# Patient Record
Sex: MALE | Race: WHITE | NOT HISPANIC OR LATINO | URBAN - METROPOLITAN AREA
[De-identification: names, ages, dates, MRNs, and addresses within clinical notes are randomized per-mention and may not be internally consistent; named-entity substitution may affect disease eponyms.]

---

## 2017-10-02 ENCOUNTER — OFFICE (OUTPATIENT)
Dept: URBAN - METROPOLITAN AREA CLINIC 95 | Facility: CLINIC | Age: 77
End: 2017-10-02

## 2017-10-02 VITALS
SYSTOLIC BLOOD PRESSURE: 107 MMHG | HEART RATE: 80 BPM | DIASTOLIC BLOOD PRESSURE: 72 MMHG | WEIGHT: 240 LBS | HEIGHT: 71 IN

## 2017-10-02 DIAGNOSIS — K30 DYSPEPSIA: ICD-10-CM

## 2017-10-02 DIAGNOSIS — R10.11 RUQ PAIN: ICD-10-CM

## 2017-10-02 PROCEDURE — 99203 OFFICE O/P NEW LOW 30 MIN: CPT

## 2017-10-02 NOTE — SERVICEHPINOTES
This is a 77 yo man here with right sided abd pain. It has been going on and off for a few months. He has not had any recent imaging studies. He denies any blood in the stool. He denies any nausea or vomiting. He denies any jaundice. There is no mention of any unintentional weight loss. The patient is in good spirits. He had a colonoscopy with Dr. Patricio in [place], [place] and he had polyps. He was told f/u in 5 years ago for repeat colonoscopy. He has intact GB.

## 2017-10-13 ENCOUNTER — OFFICE (OUTPATIENT)
Dept: URBAN - METROPOLITAN AREA CLINIC 99 | Facility: CLINIC | Age: 77
End: 2017-10-13

## 2017-10-13 VITALS
HEART RATE: 88 BPM | SYSTOLIC BLOOD PRESSURE: 111 MMHG | DIASTOLIC BLOOD PRESSURE: 69 MMHG | HEIGHT: 71 IN | WEIGHT: 241 LBS

## 2017-10-13 NOTE — SERVICEHPINOTES
This is a 77 yo man here with right sided abd pain. It has been going on and off for a few months. He has not had any recent imaging studies. He denies any blood in the stool. He denies any nausea or vomiting. He denies any jaundice. There is no mention of any unintentional weight loss. The patient is in good spirits. He had a colonoscopy with Dr. Patricio in [place], [place] and he had polyps. He was told f/u in 5 years ago for repeat colonoscopy. He has intact GB.

## 2020-06-08 ENCOUNTER — OFFICE (OUTPATIENT)
Dept: URBAN - METROPOLITAN AREA CLINIC 99 | Facility: CLINIC | Age: 80
End: 2020-06-08

## 2020-06-08 VITALS — HEIGHT: 71 IN

## 2020-06-08 DIAGNOSIS — R10.11 RUQ PAIN: ICD-10-CM

## 2020-06-08 DIAGNOSIS — C61 CARCINOMA OF PROSTATE: ICD-10-CM

## 2020-06-08 DIAGNOSIS — K76.0 STEATOSIS OF LIVER: ICD-10-CM

## 2020-06-08 DIAGNOSIS — K30 DYSPEPSIA: ICD-10-CM

## 2020-06-08 PROCEDURE — 99215 OFFICE O/P EST HI 40 MIN: CPT

## 2020-06-08 PROCEDURE — G0406 INPT/TELE FOLLOW UP 15: HCPCS

## 2020-06-08 RX ORDER — ESOMEPRAZOLE MAGNESIUM 20 MG/1
20 TABLET ORAL
Qty: 90 | Status: COMPLETED
Start: 2020-06-08 | End: 2021-03-23

## 2020-06-08 NOTE — SERVICENOTES
The patient consented to telehealth visit. PE limited by telemedicine. The patient confirms that they are in the state [facility].

## 2020-06-08 NOTE — SERVICEHPINOTES
This is a 77 yo man here with right sided abd pain. It has been going on and off for a few months. He has not had any recent imaging studies. He denies any blood in the stool. He denies any nausea or vomiting. He denies any jaundice. There is no mention of any unintentional weight loss. The patient is in good spirits. He had a colonoscopy with Dr. Patricio in [place], [place] and he had polyps. He was told f/u in 5 years ago for repeat colonoscopy. He has intact GB. BR6/8/2020: The patient states that he is having worsening abdominal pain. He is having some knee problems and he is having a knee replacement. He states that he has been taking high dose NSAIDs because of his knee pain. He is unsure if he has an ulcer. He denies any nausea or vomiting. He denies any unintentional weight loss. He denies any jaundice. He denies any fevers or chills.    The patient was given some Carafate for the abdominal pain but he is not sure if this helps. He is on a PPI.

## 2020-08-07 ENCOUNTER — APPOINTMENT (RX ONLY)
Dept: URBAN - NONMETROPOLITAN AREA CLINIC 13 | Facility: CLINIC | Age: 80
Setting detail: DERMATOLOGY
End: 2020-08-07

## 2020-08-07 DIAGNOSIS — L20.89 OTHER ATOPIC DERMATITIS: ICD-10-CM

## 2020-08-07 DIAGNOSIS — L30.4 ERYTHEMA INTERTRIGO: ICD-10-CM

## 2020-08-07 PROBLEM — L20.84 INTRINSIC (ALLERGIC) ECZEMA: Status: ACTIVE | Noted: 2020-08-07

## 2020-08-07 PROCEDURE — 99202 OFFICE O/P NEW SF 15 MIN: CPT

## 2020-08-07 PROCEDURE — ? COUNSELING

## 2020-08-07 PROCEDURE — ? RECOMMENDATIONS

## 2020-08-07 PROCEDURE — ? PRESCRIPTION

## 2020-08-07 RX ORDER — KETOCONAZOLE 20 MG/G
AAA CREAM TOPICAL
Qty: 60 | Refills: 2 | Status: CANCELLED

## 2020-08-07 ASSESSMENT — LOCATION DETAILED DESCRIPTION DERM
LOCATION DETAILED: RIGHT INGUINAL CREASE
LOCATION DETAILED: RIGHT ANTERIOR PROXIMAL THIGH

## 2020-08-07 ASSESSMENT — LOCATION ZONE DERM
LOCATION ZONE: TRUNK
LOCATION ZONE: LEG

## 2020-08-07 ASSESSMENT — LOCATION SIMPLE DESCRIPTION DERM
LOCATION SIMPLE: RIGHT THIGH
LOCATION SIMPLE: GROIN

## 2020-08-07 NOTE — HPI: OTHER
Condition:: Rash
Please Describe Your Condition:: Rash upper thighs, groin off and on x years. Treating with Triamcinolone 0.1% right now. Getting better. Lives in [place], [place], usually better down there, worse here.

## 2020-09-15 ENCOUNTER — APPOINTMENT (RX ONLY)
Dept: URBAN - NONMETROPOLITAN AREA CLINIC 13 | Facility: CLINIC | Age: 80
Setting detail: DERMATOLOGY
End: 2020-09-15

## 2020-09-15 DIAGNOSIS — L30.4 ERYTHEMA INTERTRIGO: ICD-10-CM | Status: RESOLVED

## 2020-09-15 DIAGNOSIS — L57.0 ACTINIC KERATOSIS: ICD-10-CM

## 2020-09-15 DIAGNOSIS — L81.0 POSTINFLAMMATORY HYPERPIGMENTATION: ICD-10-CM

## 2020-09-15 PROCEDURE — ? LIQUID NITROGEN

## 2020-09-15 PROCEDURE — ? COUNSELING

## 2020-09-15 PROCEDURE — 99212 OFFICE O/P EST SF 10 MIN: CPT | Mod: 25

## 2020-09-15 PROCEDURE — 17000 DESTRUCT PREMALG LESION: CPT

## 2020-09-15 PROCEDURE — ? PRESCRIPTION MEDICATION MANAGEMENT

## 2020-09-15 ASSESSMENT — LOCATION DETAILED DESCRIPTION DERM
LOCATION DETAILED: RIGHT ANTERIOR PROXIMAL THIGH
LOCATION DETAILED: LEFT SUPERIOR LATERAL MALAR CHEEK

## 2020-09-15 ASSESSMENT — LOCATION SIMPLE DESCRIPTION DERM
LOCATION SIMPLE: RIGHT THIGH
LOCATION SIMPLE: LEFT CHEEK

## 2020-09-15 ASSESSMENT — LOCATION ZONE DERM
LOCATION ZONE: LEG
LOCATION ZONE: FACE

## 2020-09-15 NOTE — PROCEDURE: PRESCRIPTION MEDICATION MANAGEMENT
Modify Regimen: Continue Ketoconazole q am until clear for flares only along with Zeazorb powder daily to prevent flares. Discussed putting socks on before undershorts to prevent spread of fungus.
Render In Strict Bullet Format?: No
Detail Level: Zone

## 2020-09-15 NOTE — HPI: OTHER
Condition:: Eczema follow up
Please Describe Your Condition:: pt has been using the Triamcinolone 0.1% bid and feels it is not much better. Still red and irritated in groin area.

## 2020-12-03 ENCOUNTER — OFFICE (OUTPATIENT)
Dept: URBAN - METROPOLITAN AREA CLINIC 95 | Facility: CLINIC | Age: 80
End: 2020-12-03

## 2020-12-03 VITALS — HEIGHT: 71 IN

## 2020-12-03 DIAGNOSIS — R10.13 EPIGASTRIC PAIN: ICD-10-CM

## 2020-12-03 DIAGNOSIS — C61 CARCINOMA OF PROSTATE: ICD-10-CM

## 2020-12-03 PROCEDURE — 99214 OFFICE O/P EST MOD 30 MIN: CPT | Performed by: INTERNAL MEDICINE

## 2020-12-03 PROCEDURE — G0406 INPT/TELE FOLLOW UP 15: HCPCS | Performed by: INTERNAL MEDICINE

## 2020-12-03 RX ORDER — PANTOPRAZOLE 20 MG/1
TABLET, DELAYED RELEASE ORAL
Qty: 30 | Status: COMPLETED
Start: 2020-12-03 | End: 2023-08-02

## 2020-12-03 NOTE — SERVICENOTES
Physical exam was limited as this was a telemedicine visit.
Outpatient telemedicine consult/visit: patient verbalized informed consent to proceed with the telephone/telehealth visit. They confirm they are in the [facility] at the time of this visit.

## 2020-12-03 NOTE — SERVICEHPINOTES
I had the pleasure of performing a telemedicine visit with your patient today accompanied by his wife. The telemedicine format was utilized secondary to the coronavirus pandemic and subsequent restrictions. The did not have video capability. I am meeting them for the first time as he was previously followed in our practice by Dr. Waters with his last telemedicine visit on June 8, 2020.The patient reports a more than 20 year history of intermittent epigastric discomfort. He describes this as a "pressure" which is more likely to occur in a postprandial fashion. He has been using an over-the-counter "Digestzen" which provides him with improvement recommended by his daughter. It is more likely to occur after coffee, creamers and sauces. He states he was placed on a medication such as Prilosec in the remote past which he took for 10 years without problems. His physician then recommend that he discontinue the medication over concerns with long-term usage. His wife states he had a prior history of ulcer disease. The patient was taking low-dose aspirin on a daily basis but discontinue the aspirin four months ago. He is not currently taking nonsteroidal anti-inflammatory medications. There is no anorexia, weight change, nausea, vomiting, regurgitation, heartburn, dysphagia, jaundice or fevers. There is no prior history of gallbladder disease. He is moving his bowels on a regular basis without diarrhea, constipation, rectal bleeding, melena or mucus.The patient underwent an upper endoscopy with Dr. Patricio on February 4, 2014 showing no evidence of esophagitis, Quintanilla's epithelium, ulcers or Helicobacter pylori. A colonoscopy on April 28, 2014 to the terminal ileum showed some mild to moderate diverticulosis no other abnormalities.Abdominal ultrasound on October 5, 2017 showed fatty infiltration of liver but no evidence of cholelithiasis.Laboratory studies from October 3, 2017 included a normal lipase, liver function tests.CT Urogram on March 21, 2017 showed renal cysts and diverticulosis but no underlying hepatic, biliary or pancreatic abnormalities.

## 2021-01-26 ENCOUNTER — RX ONLY (OUTPATIENT)
Age: 81
Setting detail: RX ONLY
End: 2021-01-26

## 2021-01-26 RX ORDER — KETOCONAZOLE 20 MG/G
AAA CREAM TOPICAL
Qty: 60 | Refills: 1 | Status: CANCELLED

## 2021-02-01 ENCOUNTER — RX ONLY (OUTPATIENT)
Age: 81
Setting detail: RX ONLY
End: 2021-02-01

## 2021-02-01 RX ORDER — KETOCONAZOLE 20 MG/G
AAA CREAM TOPICAL
Qty: 60 | Refills: 1 | Status: ERX

## 2021-02-01 RX ORDER — KETOCONAZOLE 20 MG/G
AAA CREAM TOPICAL
Qty: 60 | Refills: 2 | Status: ERX

## 2021-02-04 ENCOUNTER — RX ONLY (OUTPATIENT)
Age: 81
Setting detail: RX ONLY
End: 2021-02-04

## 2021-02-04 RX ORDER — TRIAMCINOLONE ACETONIDE 1 MG/G
AAA CREAM TOPICAL BID
Qty: 80 | Refills: 0 | Status: ERX | COMMUNITY
Start: 2021-02-04

## 2021-03-18 ENCOUNTER — OFFICE (OUTPATIENT)
Dept: URBAN - METROPOLITAN AREA CLINIC 95 | Facility: CLINIC | Age: 81
End: 2021-03-18

## 2021-03-18 VITALS
HEART RATE: 75 BPM | HEIGHT: 71 IN | TEMPERATURE: 97.2 F | WEIGHT: 245 LBS | DIASTOLIC BLOOD PRESSURE: 80 MMHG | SYSTOLIC BLOOD PRESSURE: 130 MMHG

## 2021-03-18 DIAGNOSIS — Z11.59 COVID SCREENING: ICD-10-CM

## 2021-03-18 PROCEDURE — 99212 OFFICE O/P EST SF 10 MIN: CPT | Performed by: INTERNAL MEDICINE

## 2021-03-18 NOTE — SERVICEHPINOTES
The patient is scheduled today for a COVID test only. The patient reports no symptoms of COVID and denies fever (greater than 100.4 F or 38.0 C) cough, shortness of breath, difficulty breathing, chest pain, sore throat, loss of sense of smell or taste, new onset of severe fatigue or lack of energy and recent onset of nausea with or without vomiting or diarrhea. Patient denies recent travel outside of the country in the past 14 days. BRPatient also denies close contact (6 feet) with someone who has a laboratory confirmed COVID-19 diagnosis in the past 14 days BRToday a nasal swab or nasopharyngeal swab was obtained for COVID19 PCR and sent to the lab. BR

## 2021-03-23 ENCOUNTER — AMBULATORY SURGICAL CENTER (OUTPATIENT)
Dept: URBAN - METROPOLITAN AREA SURGERY 64 | Facility: SURGERY | Age: 81
End: 2021-03-23

## 2021-03-23 VITALS
DIASTOLIC BLOOD PRESSURE: 90 MMHG | OXYGEN SATURATION: 95 % | TEMPERATURE: 98 F | HEART RATE: 66 BPM | RESPIRATION RATE: 14 BRPM | SYSTOLIC BLOOD PRESSURE: 153 MMHG | WEIGHT: 242 LBS | HEIGHT: 71 IN

## 2021-03-23 DIAGNOSIS — K30 FUNCTIONAL DYSPEPSIA: ICD-10-CM

## 2021-03-23 DIAGNOSIS — R10.13 EPIGASTRIC PAIN: ICD-10-CM

## 2021-03-23 DIAGNOSIS — K22.8 OTHER SPECIFIED DISEASES OF ESOPHAGUS: ICD-10-CM

## 2021-03-23 DIAGNOSIS — K31.89 OTHER DISEASES OF STOMACH AND DUODENUM: ICD-10-CM

## 2021-03-23 DIAGNOSIS — K20.80 OTHER ESOPHAGITIS WITHOUT BLEEDING: ICD-10-CM

## 2021-03-23 LAB — SURGICAL: PDF REPORT: (no result)

## 2021-03-23 PROCEDURE — 43239 EGD BIOPSY SINGLE/MULTIPLE: CPT | Performed by: INTERNAL MEDICINE

## 2021-03-23 RX ORDER — PANTOPRAZOLE 20 MG/1
TABLET, DELAYED RELEASE ORAL
Qty: 30 | Status: COMPLETED
Start: 2020-12-03 | End: 2023-08-02

## 2021-03-23 RX ORDER — ESOMEPRAZOLE MAGNESIUM 20 MG/1
20 TABLET ORAL
Qty: 90 | Status: COMPLETED
Start: 2020-06-08 | End: 2021-03-23

## 2021-03-23 NOTE — SERVICEHPINOTES
I had the pleasure of performing a telemedicine visit with your patient today accompanied by his wife. The telemedicine format was utilized secondary to the coronavirus pandemic and subsequent restrictions. The did not have video capability. I am meeting them for the first time as he was previously followed in our practice by Dr. Waters with his last telemedicine visit on June 8, 2020.The patient reports a more than 20 year history of intermittent epigastric discomfort. He describes this as a "pressure" which is more likely to occur in a postprandial fashion. He has been using an over-the-counter "Digestzen" which provides him with improvement recommended by his daughter. It is more likely to occur after coffee, creamers and sauces. He states he was placed on a medication such as Prilosec in the remote past which he took for 10 years without problems. His physician then recommend that he discontinue the medication over concerns with long-term usage. His wife states he had a prior history of ulcer disease. The patient was taking low-dose aspirin on a daily basis but discontinue the aspirin four months ago. He is not currently taking nonsteroidal anti-inflammatory medications. There is no anorexia, weight change, nausea, vomiting, regurgitation, heartburn, dysphagia, jaundice or fevers. There is no prior history of gallbladder disease. He is moving his bowels on a regular basis without diarrhea, constipation, rectal bleeding, melena or mucus.The patient underwent an upper endoscopy with Dr. Patricio on February 4, 2014 showing no evidence of esophagitis, Quintanilla's epithelium, ulcers or Helicobacter pylori. A colonoscopy on April 28, 2014 to the terminal ileum showed some mild to moderate diverticulosis no other abnormalities.Abdominal ultrasound on October 5, 2017 showed fatty infiltration of liver but no evidence of cholelithiasis.Laboratory studies from October 3, 2017 included a normal lipase, liver function tests.CT Urogram on March 21, 2017 showed renal cysts and diverticulosis but no underlying hepatic, biliary or pancreatic abnormalities.    Patient's last History and Physical reviewed and no change.

## 2021-04-07 ENCOUNTER — OFFICE (OUTPATIENT)
Dept: URBAN - METROPOLITAN AREA CLINIC 95 | Facility: CLINIC | Age: 81
End: 2021-04-07

## 2021-04-07 VITALS — HEIGHT: 71 IN

## 2021-04-07 DIAGNOSIS — R10.13 EPIGASTRIC PAIN: ICD-10-CM

## 2021-04-07 DIAGNOSIS — C61 CARCINOMA OF PROSTATE: ICD-10-CM

## 2021-04-07 DIAGNOSIS — K30 DYSPEPSIA: ICD-10-CM

## 2021-04-07 PROCEDURE — G0406 INPT/TELE FOLLOW UP 15: HCPCS | Performed by: INTERNAL MEDICINE

## 2021-04-07 PROCEDURE — 99214 OFFICE O/P EST MOD 30 MIN: CPT | Performed by: INTERNAL MEDICINE

## 2021-04-07 NOTE — SERVICEHPINOTES
12/3/20: I had the pleasure of performing a telemedicine visit with your patient today accompanied by his wife. The telemedicine format was utilized secondary to the coronavirus pandemic and subsequent restrictions. The did not have video capability. I am meeting them for the first time as he was previously followed in our practice by Dr. Waters with his last telemedicine visit on June 8, 2020.The patient reports a more than 20 year history of intermittent epigastric discomfort. He describes this as a "pressure" which is more likely to occur in a postprandial fashion. He has been using an over-the-counter "Digestzen" which provides him with improvement recommended by his daughter. It is more likely to occur after coffee, creamers and sauces. He states he was placed on a medication such as Prilosec in the remote past which he took for 10 years without problems. His physician then recommend that he discontinue the medication over concerns with long-term usage. His wife states he had a prior history of ulcer disease. The patient was taking low-dose aspirin on a daily basis but discontinue the aspirin four months ago. He is not currently taking nonsteroidal anti-inflammatory medications. There is no anorexia, weight change, nausea, vomiting, regurgitation, heartburn, dysphagia, jaundice or fevers. There is no prior history of gallbladder disease. He is moving his bowels on a regular basis without diarrhea, constipation, rectal bleeding, melena or mucus.The patient underwent an upper endoscopy with Dr. Patricio on February 4, 2014 showing no evidence of esophagitis, Quintanilla's epithelium, ulcers or Helicobacter pylori. A colonoscopy on April 28, 2014 to the terminal ileum showed some mild to moderate diverticulosis no other abnormalities.Abdominal ultrasound on October 5, 2017 showed fatty infiltration of liver but no evidence of cholelithiasis.Laboratory studies from October 3, 2017 included a normal lipase, liver function tests.CT Urogram on March 21, 2017 showed renal cysts and diverticulosis but no underlying hepatic, biliary or pancreatic abnormalities.4/7/21: I had the pleasure of performing a telemedicine visit with your patient today accompanied by his wife. The telemedicine format was utilized secondary to the coronavirus pandemic and subsequent restrictions. They did not have video capability for today's visit. His last telemedicine visit was conducted on December 3, 2020.Abdominal ultrasound from December 14, 2020 showed no evidence of gallstones, biliary ductal dilatation or focal hepatic abnormalities. Bilateral renal cysts were noted. The patient has a long-standing history of renal cysts.At the time of his last visit I recommended he begin using pantoprazole 40 mg daily. He did not comply with this recommendation.An upper endoscopy was performed by Dr. Campos on March 23, 2021. This examination showed no evidence of esophagitis or Quintanilla's epithelium. There was some patchy erythema in the body and antrum of stomach with biopsies negative for Helicobacter pylori. There was some peptic duodenitis.Following the procedure he began using pantoprazole 40 mg each morning 30 to 60 minutes before breakfast. After several days there is no clinical improvement but over the last five days his previous complaints of epigastric pressure pains have resolved. There is no history of anorexia or weight change. According to the patient he is 5'11" and weighs 242 pounds which correlates to a BMI of 33.7. He is not experiencing nausea, vomiting, heartburn, regurgitation, dysphagia, jaundice or fevers. He is moving his bowels on a regular basis without constipation, diarrhea, rectal bleeding, melena or mucus.

## 2021-04-07 NOTE — SERVICENOTES
Physical exam was limited as this was a telemedicine visit.
Outpatient telemedicine consult/visit: patient verbalized informed consent to proceed with the telephone/telehealth visit. They confirm they are in the [facility] at the time of this visit.

## 2021-06-25 ENCOUNTER — RX ONLY (OUTPATIENT)
Age: 81
Setting detail: RX ONLY
End: 2021-06-25

## 2021-06-25 RX ORDER — TRIAMCINOLONE ACETONIDE 1 MG/G
AAA CREAM TOPICAL BID
Qty: 80 | Refills: 0 | Status: ERX

## 2021-06-25 RX ORDER — KETOCONAZOLE 20 MG/G
AAA CREAM TOPICAL
Qty: 60 | Refills: 1 | Status: ERX

## 2021-07-22 ENCOUNTER — RX ONLY (OUTPATIENT)
Age: 81
Setting detail: RX ONLY
End: 2021-07-22

## 2021-07-22 RX ORDER — VALACYCLOVIR HYDROCHLORIDE 1 G/1
1 TABLET, FILM COATED ORAL TID
Qty: 30 | Refills: 0 | Status: ERX | COMMUNITY
Start: 2021-07-22

## 2021-12-10 ENCOUNTER — APPOINTMENT (RX ONLY)
Dept: URBAN - NONMETROPOLITAN AREA CLINIC 13 | Facility: CLINIC | Age: 81
Setting detail: DERMATOLOGY
End: 2021-12-10

## 2021-12-10 ENCOUNTER — RX ONLY (OUTPATIENT)
Age: 81
Setting detail: RX ONLY
End: 2021-12-10

## 2021-12-10 DIAGNOSIS — D485 NEOPLASM OF UNCERTAIN BEHAVIOR OF SKIN: ICD-10-CM

## 2021-12-10 DIAGNOSIS — L30.4 ERYTHEMA INTERTRIGO: ICD-10-CM | Status: IMPROVED

## 2021-12-10 DIAGNOSIS — L29.8 OTHER PRURITUS: ICD-10-CM

## 2021-12-10 DIAGNOSIS — L29.89 OTHER PRURITUS: ICD-10-CM

## 2021-12-10 PROBLEM — D48.5 NEOPLASM OF UNCERTAIN BEHAVIOR OF SKIN: Status: ACTIVE | Noted: 2021-12-10

## 2021-12-10 PROCEDURE — ? PRESCRIPTION

## 2021-12-10 PROCEDURE — ? PRESCRIPTION MEDICATION MANAGEMENT

## 2021-12-10 PROCEDURE — ? COUNSELING

## 2021-12-10 PROCEDURE — ? ADDITIONAL NOTES

## 2021-12-10 PROCEDURE — ? MEDICATION COUNSELING

## 2021-12-10 PROCEDURE — ? BIOPSY BY SHAVE METHOD

## 2021-12-10 PROCEDURE — 99213 OFFICE O/P EST LOW 20 MIN: CPT | Mod: 25

## 2021-12-10 PROCEDURE — 11102 TANGNTL BX SKIN SINGLE LES: CPT

## 2021-12-10 RX ORDER — TRIAMCINOLONE ACETONIDE 1 MG/G
AAA CREAM TOPICAL
Qty: 45 | Refills: 1 | Status: ERX | COMMUNITY
Start: 2021-12-10

## 2021-12-10 RX ORDER — KETOCONAZOLE 20 MG/G
AAA CREAM TOPICAL
Qty: 60 | Refills: 1 | Status: ERX

## 2021-12-10 RX ADMIN — TRIAMCINOLONE ACETONIDE AAA: 1 CREAM TOPICAL at 00:00

## 2021-12-10 ASSESSMENT — LOCATION DETAILED DESCRIPTION DERM
LOCATION DETAILED: LEFT PROXIMAL RADIAL DORSAL FOREARM
LOCATION DETAILED: RIGHT MEDIAL FRONTAL SCALP
LOCATION DETAILED: RIGHT ANTERIOR PROXIMAL THIGH
LOCATION DETAILED: LEFT ANTERIOR PROXIMAL THIGH

## 2021-12-10 ASSESSMENT — LOCATION SIMPLE DESCRIPTION DERM
LOCATION SIMPLE: LEFT THIGH
LOCATION SIMPLE: RIGHT SCALP
LOCATION SIMPLE: LEFT FOREARM
LOCATION SIMPLE: RIGHT THIGH

## 2021-12-10 ASSESSMENT — LOCATION ZONE DERM
LOCATION ZONE: LEG
LOCATION ZONE: ARM
LOCATION ZONE: SCALP

## 2021-12-10 NOTE — PROCEDURE: ADDITIONAL NOTES
Additional Notes: Has about 5 other small lesions around this larger lesion that may need treated depending on results of biopsy
Detail Level: Simple
Render Risk Assessment In Note?: no

## 2021-12-10 NOTE — PROCEDURE: COUNSELING
Detail Level: Detailed
Patient Specific Counseling (Will Not Stick From Patient To Patient): DISCUSSES SARNA LOTION

## 2021-12-10 NOTE — PROCEDURE: MEDICATION COUNSELING
Azithromycin Pregnancy And Lactation Text: This medication is considered safe during pregnancy and is also secreted in breast milk.
Rifampin Pregnancy And Lactation Text: This medication is Pregnancy Category C and it isn't know if it is safe during pregnancy. It is also excreted in breast milk and should not be used if you are breast feeding.
Acitretin Counseling:  I discussed with the patient the risks of acitretin including but not limited to hair loss, dry lips/skin/eyes, liver damage, hyperlipidemia, depression/suicidal ideation, photosensitivity.  Serious rare side effects can include but are not limited to pancreatitis, pseudotumor cerebri, bony changes, clot formation/stroke/heart attack.  Patient understands that alcohol is contraindicated since it can result in liver toxicity and significantly prolong the elimination of the drug by many years.
Gabapentin Counseling: I discussed with the patient the risks of gabapentin including but not limited to dizziness, somnolence, fatigue and ataxia.
Xeljanz Counseling: I discussed with the patient the risks of Xeljanz therapy including increased risk of infection, liver issues, headache, diarrhea, or cold symptoms. Live vaccines should be avoided. They were instructed to call if they have any problems.
Rhofade Counseling: Rhofade is a topical medication which can decrease superficial blood flow where applied. Side effects are uncommon and include stinging, redness and allergic reactions.
Propranolol Counseling:  I discussed with the patient the risks of propranolol including but not limited to low heart rate, low blood pressure, low blood sugar, restlessness and increased cold sensitivity. They should call the office if they experience any of these side effects.
Drysol Counseling:  I discussed with the patient the risks of drysol/aluminum chloride including but not limited to skin rash, itching, irritation, burning.
Tetracycline Pregnancy And Lactation Text: This medication is Pregnancy Category D and not consider safe during pregnancy. It is also excreted in breast milk.
Hydroxyzine Pregnancy And Lactation Text: This medication is not safe during pregnancy and should not be taken. It is also excreted in breast milk and breast feeding isn't recommended.
Bactrim Counseling:  I discussed with the patient the risks of sulfa antibiotics including but not limited to GI upset, allergic reaction, drug rash, diarrhea, dizziness, photosensitivity, and yeast infections.  Rarely, more serious reactions can occur including but not limited to aplastic anemia, agranulocytosis, methemoglobinemia, blood dyscrasias, liver or kidney failure, lung infiltrates or desquamative/blistering drug rashes.
Humira Pregnancy And Lactation Text: This medication is Pregnancy Category B and is considered safe during pregnancy. It is unknown if this medication is excreted in breast milk.
Tremfya Pregnancy And Lactation Text: The risk during pregnancy and breastfeeding is uncertain with this medication.
Finasteride Pregnancy And Lactation Text: This medication is absolutely contraindicated during pregnancy. It is unknown if it is excreted in breast milk.
5-Fu Pregnancy And Lactation Text: This medication is Pregnancy Category X and contraindicated in pregnancy and in women who may become pregnant. It is unknown if this medication is excreted in breast milk.
Tetracycline Counseling: Patient counseled regarding possible photosensitivity and increased risk for sunburn.  Patient instructed to avoid sunlight, if possible.  When exposed to sunlight, patients should wear protective clothing, sunglasses, and sunscreen.  The patient was instructed to call the office immediately if the following severe adverse effects occur:  hearing changes, easy bruising/bleeding, severe headache, or vision changes.  The patient verbalized understanding of the proper use and possible adverse effects of tetracycline.  All of the patient's questions and concerns were addressed. Patient understands to avoid pregnancy while on therapy due to potential birth defects.
Oxybutynin Pregnancy And Lactation Text: This medication is Pregnancy Category B and is considered safe during pregnancy. It is unknown if it is excreted in breast milk.
Humira Counseling:  I discussed with the patient the risks of adalimumab including but not limited to myelosuppression, immunosuppression, autoimmune hepatitis, demyelinating diseases, lymphoma, and serious infections.  The patient understands that monitoring is required including a PPD at baseline and must alert us or the primary physician if symptoms of infection or other concerning signs are noted.
Hydroxyzine Counseling: Patient advised that the medication is sedating and not to drive a car after taking this medication.  Patient informed of potential adverse effects including but not limited to dry mouth, urinary retention, and blurry vision.  The patient verbalized understanding of the proper use and possible adverse effects of hydroxyzine.  All of the patient's questions and concerns were addressed.
Protopic Pregnancy And Lactation Text: This medication is Pregnancy Category C. It is unknown if this medication is excreted in breast milk when applied topically.
Prednisone Pregnancy And Lactation Text: This medication is Pregnancy Category C and it isn't know if it is safe during pregnancy. This medication is excreted in breast milk.
Tremfya Counseling: I discussed with the patient the risks of guselkumab including but not limited to immunosuppression, serious infections, worsening of inflammatory bowel disease and drug reactions.  The patient understands that monitoring is required including a PPD at baseline and must alert us or the primary physician if symptoms of infection or other concerning signs are noted.
Bactrim Pregnancy And Lactation Text: This medication is Pregnancy Category D and is known to cause fetal risk.  It is also excreted in breast milk.
5-Fu Counseling: 5-Fluorouracil Counseling:  I discussed with the patient the risks of 5-fluorouracil including but not limited to erythema, scaling, itching, weeping, crusting, and pain.
Oxybutynin Counseling:  I discussed with the patient the risks of oxybutynin including but not limited to skin rash, drowsiness, dry mouth, difficulty urinating, and blurred vision.
Finasteride Male Counseling: Finasteride Counseling:  I discussed with the patient the risks of use of finasteride including but not limited to decreased libido, decreased ejaculate volume, gynecomastia, and depression. Women should not handle medication.  All of the patient's questions and concerns were addressed.
Protopic Counseling: Patient may experience a mild burning sensation during topical application. Protopic is not approved in children less than 2 years of age. There have been case reports of hematologic and skin malignancies in patients using topical calcineurin inhibitors although causality is questionable.
Doxepin Pregnancy And Lactation Text: This medication is Pregnancy Category C and it isn't known if it is safe during pregnancy. It is also excreted in breast milk and breast feeding isn't recommended.
Prednisone Counseling:  I discussed with the patient the risks of prolonged use of prednisone including but not limited to weight gain, insomnia, osteoporosis, mood changes, diabetes, susceptibility to infection, glaucoma and high blood pressure.  In cases where prednisone use is prolonged, patients should be monitored with blood pressure checks, serum glucose levels and an eye exam.  Additionally, the patient may need to be placed on GI prophylaxis, PCP prophylaxis, and calcium and vitamin D supplementation and/or a bisphosphonate.  The patient verbalized understanding of the proper use and the possible adverse effects of prednisone.  All of the patient's questions and concerns were addressed.
Cephalexin Counseling: I counseled the patient regarding use of cephalexin as an antibiotic for prophylactic and/or therapeutic purposes. Cephalexin (commonly prescribed under brand name Keflex) is a cephalosporin antibiotic which is active against numerous classes of bacteria, including most skin bacteria. Side effects may include nausea, diarrhea, gastrointestinal upset, rash, hives, yeast infections, and in rare cases, hepatitis, kidney disease, seizures, fever, confusion, neurologic symptoms, and others. Patients with severe allergies to penicillin medications are cautioned that there is about a 10% incidence of cross-reactivity with cephalosporins. When possible, patients with penicillin allergies should use alternatives to cephalosporins for antibiotic therapy.
Otezla Pregnancy And Lactation Text: This medication is Pregnancy Category C and it isn't known if it is safe during pregnancy. It is unknown if it is excreted in breast milk.
Erivedge Pregnancy And Lactation Text: This medication is Pregnancy Category X and is absolutely contraindicated during pregnancy. It is unknown if it is excreted in breast milk.
Calcipotriene Pregnancy And Lactation Text: This medication has not been proven safe during pregnancy. It is unknown if this medication is excreted in breast milk.
Sarecycline Counseling: Patient advised regarding possible photosensitivity and discoloration of the teeth, skin, lips, tongue and gums.  Patient instructed to avoid sunlight, if possible.  When exposed to sunlight, patients should wear protective clothing, sunglasses, and sunscreen.  The patient was instructed to call the office immediately if the following severe adverse effects occur:  hearing changes, easy bruising/bleeding, severe headache, or vision changes.  The patient verbalized understanding of the proper use and possible adverse effects of sarecycline.  All of the patient's questions and concerns were addressed.
Zyclara Pregnancy And Lactation Text: This medication is Pregnancy Category C. It is unknown if this medication is excreted in breast milk.
Enbrel Counseling:  I discussed with the patient the risks of etanercept including but not limited to myelosuppression, immunosuppression, autoimmune hepatitis, demyelinating diseases, lymphoma, and infections.  The patient understands that monitoring is required including a PPD at baseline and must alert us or the primary physician if symptoms of infection or other concerning signs are noted.
Doxepin Counseling:  Patient advised that the medication is sedating and not to drive a car after taking this medication. Patient informed of potential adverse effects including but not limited to dry mouth, urinary retention, and blurry vision.  The patient verbalized understanding of the proper use and possible adverse effects of doxepin.  All of the patient's questions and concerns were addressed.
Methotrexate Pregnancy And Lactation Text: This medication is Pregnancy Category X and is known to cause fetal harm. This medication is excreted in breast milk.
Taltz Counseling: I discussed with the patient the risks of ixekizumab including but not limited to immunosuppression, serious infections, worsening of inflammatory bowel disease and drug reactions.  The patient understands that monitoring is required including a PPD at baseline and must alert us or the primary physician if symptoms of infection or other concerning signs are noted.
Cephalexin Pregnancy And Lactation Text: This medication is Pregnancy Category B and considered safe during pregnancy.  It is also excreted in breast milk but can be used safely for shorter doses.
Calcipotriene Counseling:  I discussed with the patient the risks of calcipotriene including but not limited to erythema, scaling, itching, and irritation.
Erivedge Counseling- I discussed with the patient the risks of Erivedge including but not limited to nausea, vomiting, diarrhea, constipation, weight loss, changes in the sense of taste, decreased appetite, muscle spasms, and hair loss.  The patient verbalized understanding of the proper use and possible adverse effects of Erivedge.  All of the patient's questions and concerns were addressed.
Cimetidine Pregnancy And Lactation Text: This medication is Pregnancy Category B and is considered safe during pregnancy. It is also excreted in breast milk and breast feeding isn't recommended.
Use Enhanced Medication Counseling?: No
Picato Counseling:  I discussed with the patient the risks of Picato including but not limited to erythema, scaling, itching, weeping, crusting, and pain.
Otezla Counseling: The side effects of Otezla were discussed with the patient, including but not limited to worsening or new depression, weight loss, diarrhea, nausea, upper respiratory tract infection, and headache. Patient instructed to call the office should any adverse effect occur.  The patient verbalized understanding of the proper use and possible adverse effects of Otezla.  All the patient's questions and concerns were addressed.
Zyclara Counseling:  I discussed with the patient the risks of imiquimod including but not limited to erythema, scaling, itching, weeping, crusting, and pain.  Patient understands that the inflammatory response to imiquimod is variable from person to person and was educated regarded proper titration schedule.  If flu-like symptoms develop, patient knows to discontinue the medication and contact us.
Clindamycin Counseling: I counseled the patient regarding use of clindamycin as an antibiotic for prophylactic and/or therapeutic purposes. Clindamycin is active against numerous classes of bacteria, including skin bacteria. Side effects may include nausea, diarrhea, gastrointestinal upset, rash, hives, yeast infections, and in rare cases, colitis.
Dupixent Pregnancy And Lactation Text: This medication likely crosses the placenta but the risk for the fetus is uncertain. This medication is excreted in breast milk.
Methotrexate Counseling:  Patient counseled regarding adverse effects of methotrexate including but not limited to nausea, vomiting, abnormalities in liver function tests. Patients may develop mouth sores, rash, diarrhea, and abnormalities in blood counts. The patient understands that monitoring is required including LFT's and blood counts.  There is a rare possibility of scarring of the liver and lung problems that can occur when taking methotrexate. Persistent nausea, loss of appetite, pale stools, dark urine, cough, and shortness of breath should be reported immediately. Patient advised to discontinue methotrexate treatment at least three months before attempting to become pregnant.  I discussed the need for folate supplements while taking methotrexate.  These supplements can decrease side effects during methotrexate treatment. The patient verbalized understanding of the proper use and possible adverse effects of methotrexate.  All of the patient's questions and concerns were addressed.
Dapsone Pregnancy And Lactation Text: This medication is Pregnancy Category C and is not considered safe during pregnancy or breast feeding.
Mirvaso Pregnancy And Lactation Text: This medication has not been assigned a Pregnancy Risk Category. It is unknown if the medication is excreted in breast milk.
Valtrex Pregnancy And Lactation Text: this medication is Pregnancy Category B and is considered safe during pregnancy. This medication is not directly found in breast milk but it's metabolite acyclovir is present.
Cimetidine Counseling:  I discussed with the patient the risks of Cimetidine including but not limited to gynecomastia, headache, diarrhea, nausea, drowsiness, arrhythmias, pancreatitis, skin rashes, psychosis, bone marrow suppression and kidney toxicity.
Stelara Counseling:  I discussed with the patient the risks of ustekinumab including but not limited to immunosuppression, malignancy, posterior leukoencephalopathy syndrome, and serious infections.  The patient understands that monitoring is required including a PPD at baseline and must alert us or the primary physician if symptoms of infection or other concerning signs are noted.
Clindamycin Pregnancy And Lactation Text: This medication can be used in pregnancy if certain situations. Clindamycin is also present in breast milk.
Carac Counseling:  I discussed with the patient the risks of Carac including but not limited to erythema, scaling, itching, weeping, crusting, and pain.
Dupixent Counseling: I discussed with the patient the risks of dupilumab including but not limited to eye infection and irritation, cold sores, injection site reactions, worsening of asthma, allergic reactions and increased risk of parasitic infection.  Live vaccines should be avoided while taking dupilumab. Dupilumab will also interact with certain medications such as warfarin and cyclosporine. The patient understands that monitoring is required and they must alert us or the primary physician if symptoms of infection or other concerning signs are noted.
Dapsone Counseling: I discussed with the patient the risks of dapsone including but not limited to hemolytic anemia, agranulocytosis, rashes, methemoglobinemia, kidney failure, peripheral neuropathy, headaches, GI upset, and liver toxicity.  Patients who start dapsone require monitoring including baseline LFTs and weekly CBCs for the first month, then every month thereafter.  The patient verbalized understanding of the proper use and possible adverse effects of dapsone.  All of the patient's questions and concerns were addressed.
Mirvaso Counseling: Mirvaso is a topical medication which can decrease superficial blood flow where applied. Side effects are uncommon and include stinging, redness and allergic reactions.
Valtrex Counseling: I discussed with the patient the risks of valacyclovir including but not limited to kidney damage, nausea, vomiting and severe allergy.  The patient understands that if the infection seems to be worsening or is not improving, they are to call.
Odomzo Counseling- I discussed with the patient the risks of Odomzo including but not limited to nausea, vomiting, diarrhea, constipation, weight loss, changes in the sense of taste, decreased appetite, muscle spasms, and hair loss.  The patient verbalized understanding of the proper use and possible adverse effects of Odomzo.  All of the patient's questions and concerns were addressed.
Wartpeel Counseling:  I discussed with the patient the risks of Wartpeel including but not limited to erythema, scaling, itching, weeping, crusting, and pain.
Doxycycline Counseling:  Patient counseled regarding possible photosensitivity and increased risk for sunburn.  Patient instructed to avoid sunlight, if possible.  When exposed to sunlight, patients should wear protective clothing, sunglasses, and sunscreen.  The patient was instructed to call the office immediately if the following severe adverse effects occur:  hearing changes, easy bruising/bleeding, severe headache, or vision changes.  The patient verbalized understanding of the proper use and possible adverse effects of doxycycline.  All of the patient's questions and concerns were addressed.
Colchicine Pregnancy And Lactation Text: This medication is Pregnancy Category C and isn't considered safe during pregnancy. It is excreted in breast milk.
Cyclosporine Counseling:  I discussed with the patient the risks of cyclosporine including but not limited to hypertension, gingival hyperplasia,myelosuppression, immunosuppression, liver damage, kidney damage, neurotoxicity, lymphoma, and serious infections. The patient understands that monitoring is required including baseline blood pressure, CBC, CMP, lipid panel and uric acid, and then 1-2 times monthly CMP and blood pressure.
Benzoyl Peroxide Pregnancy And Lactation Text: This medication is Pregnancy Category C. It is unknown if benzoyl peroxide is excreted in breast milk.
Nsaids Pregnancy And Lactation Text: These medications are considered safe up to 30 weeks gestation. It is excreted in breast milk.
Minoxidil Pregnancy And Lactation Text: This medication has not been assigned a Pregnancy Risk Category but animal studies failed to show danger with the topical medication. It is unknown if the medication is excreted in breast milk.
Cosentyx Counseling:  I discussed with the patient the risks of Cosentyx including but not limited to worsening of Crohn's disease, immunosuppression, allergic reactions and infections.  The patient understands that monitoring is required including a PPD at baseline and must alert us or the primary physician if symptoms of infection or other concerning signs are noted.
Topical Sulfur Applications Pregnancy And Lactation Text: This medication is Pregnancy Category C and has an unknown safety profile during pregnancy. It is unknown if this topical medication is excreted in breast milk.
Tranexamic Acid Pregnancy And Lactation Text: It is unknown if this medication is safe during pregnancy or breast feeding.
Doxycycline Pregnancy And Lactation Text: This medication is Pregnancy Category D and not consider safe during pregnancy. It is also excreted in breast milk but is considered safe for shorter treatment courses.
Cyclophosphamide Pregnancy And Lactation Text: This medication is Pregnancy Category D and it isn't considered safe during pregnancy. This medication is excreted in breast milk.
Colchicine Counseling:  Patient counseled regarding adverse effects including but not limited to stomach upset (nausea, vomiting, stomach pain, or diarrhea).  Patient instructed to limit alcohol consumption while taking this medication.  Colchicine may reduce blood counts especially with prolonged use.  The patient understands that monitoring of kidney function and blood counts may be required, especially at baseline. The patient verbalized understanding of the proper use and possible adverse effects of colchicine.  All of the patient's questions and concerns were addressed.
Minoxidil Counseling: Minoxidil is a topical medication which can increase blood flow where it is applied. It is uncertain how this medication increases hair growth. Side effects are uncommon and include stinging and allergic reactions.
Skyrizi Counseling: I discussed with the patient the risks of risankizumab-rzaa including but not limited to immunosuppression, and serious infections.  The patient understands that monitoring is required including a PPD at baseline and must alert us or the primary physician if symptoms of infection or other concerning signs are noted.
Benzoyl Peroxide Counseling: Patient counseled that medicine may cause skin irritation and bleach clothing.  In the event of skin irritation, the patient was advised to reduce the amount of the drug applied or use it less frequently.   The patient verbalized understanding of the proper use and possible adverse effects of benzoyl peroxide.  All of the patient's questions and concerns were addressed.
Nsaids Counseling: NSAID Counseling: I discussed with the patient that NSAIDs should be taken with food. Prolonged use of NSAIDs can result in the development of stomach ulcers.  Patient advised to stop taking NSAIDs if abdominal pain occurs.  The patient verbalized understanding of the proper use and possible adverse effects of NSAIDs.  All of the patient's questions and concerns were addressed.
Terbinafine Counseling: Patient counseling regarding adverse effects of terbinafine including but not limited to headache, diarrhea, rash, upset stomach, liver function test abnormalities, itching, taste/smell disturbance, nausea, abdominal pain, and flatulence.  There is a rare possibility of liver failure that can occur when taking terbinafine.  The patient understands that a baseline LFT and kidney function test may be required. The patient verbalized understanding of the proper use and possible adverse effects of terbinafine.  All of the patient's questions and concerns were addressed.
Topical Sulfur Applications Counseling: Topical Sulfur Counseling: Patient counseled that this medication may cause skin irritation or allergic reactions.  In the event of skin irritation, the patient was advised to reduce the amount of the drug applied or use it less frequently.   The patient verbalized understanding of the proper use and possible adverse effects of topical sulfur application.  All of the patient's questions and concerns were addressed.
Erythromycin Counseling:  I discussed with the patient the risks of erythromycin including but not limited to GI upset, allergic reaction, drug rash, diarrhea, increase in liver enzymes, and yeast infections.
Tranexamic Acid Counseling:  Patient advised of the small risk of bleeding problems with tranexamic acid. They were also instructed to call if they developed any nausea, vomiting or diarrhea. All of the patient's questions and concerns were addressed.
Cimzia Pregnancy And Lactation Text: This medication crosses the placenta but can be considered safe in certain situations. Cimzia may be excreted in breast milk.
Cyclophosphamide Counseling:  I discussed with the patient the risks of cyclophosphamide including but not limited to hair loss, hormonal abnormalities, decreased fertility, abdominal pain, diarrhea, nausea and vomiting, bone marrow suppression and infection. The patient understands that monitoring is required while taking this medication.
Niacinamide Pregnancy And Lactation Text: These medications are considered safe during pregnancy.
Cimzia Counseling:  I discussed with the patient the risks of Cimzia including but not limited to immunosuppression, allergic reactions and infections.  The patient understands that monitoring is required including a PPD at baseline and must alert us or the primary physician if symptoms of infection or other concerning signs are noted.
Ketoconazole Pregnancy And Lactation Text: This medication is Pregnancy Category C and it isn't know if it is safe during pregnancy. It is also excreted in breast milk and breast feeding isn't recommended.
Simponi Counseling:  I discussed with the patient the risks of golimumab including but not limited to myelosuppression, immunosuppression, autoimmune hepatitis, demyelinating diseases, lymphoma, and serious infections.  The patient understands that monitoring is required including a PPD at baseline and must alert us or the primary physician if symptoms of infection or other concerning signs are noted.
Erythromycin Pregnancy And Lactation Text: This medication is Pregnancy Category B and is considered safe during pregnancy. It is also excreted in breast milk.
Niacinamide Counseling: I recommended taking niacin or niacinamide, also know as vitamin B3, twice daily. Recent evidence suggests that taking vitamin B3 (500 mg twice daily) can reduce the risk of actinic keratoses and non-melanoma skin cancers. Side effects of vitamin B3 include flushing and headache.
Cellcept Pregnancy And Lactation Text: This medication is Pregnancy Category D and isn't considered safe during pregnancy. It is unknown if this medication is excreted in breast milk.
Clofazimine Counseling:  I discussed with the patient the risks of clofazimine including but not limited to skin and eye pigmentation, liver damage, nausea/vomiting, gastrointestinal bleeding and allergy.
Topical Clindamycin Counseling: Patient counseled that this medication may cause skin irritation or allergic reactions.  In the event of skin irritation, the patient was advised to reduce the amount of the drug applied or use it less frequently.   The patient verbalized understanding of the proper use and possible adverse effects of clindamycin.  All of the patient's questions and concerns were addressed.
Thalidomide Counseling: I discussed with the patient the risks of thalidomide including but not limited to birth defects, anxiety, weakness, chest pain, dizziness, cough and severe allergy.
Imiquimod Counseling:  I discussed with the patient the risks of imiquimod including but not limited to erythema, scaling, itching, weeping, crusting, and pain.  Patient understands that the inflammatory response to imiquimod is variable from person to person and was educated regarded proper titration schedule.  If flu-like symptoms develop, patient knows to discontinue the medication and contact us.
Ketoconazole Counseling:   Patient counseled regarding improving absorption with orange juice.  Adverse effects include but are not limited to breast enlargement, headache, diarrhea, nausea, upset stomach, liver function test abnormalities, taste disturbance, and stomach pain.  There is a rare possibility of liver failure that can occur when taking ketoconazole. The patient understands that monitoring of LFTs may be required, especially at baseline. The patient verbalized understanding of the proper use and possible adverse effects of ketoconazole.  All of the patient's questions and concerns were addressed.
Detail Level: Simple
Metronidazole Counseling:  I discussed with the patient the risks of metronidazole including but not limited to seizures, nausea/vomiting, a metallic taste in the mouth, nausea/vomiting and severe allergy.
Libtayo Pregnancy And Lactation Text: This medication is contraindicated in pregnancy and when breast feeding.
Cellcept Counseling:  I discussed with the patient the risks of mycophenolate mofetil including but not limited to infection/immunosuppression, GI upset, hypokalemia, hypercholesterolemia, bone marrow suppression, lymphoproliferative disorders, malignancy, GI ulceration/bleed/perforation, colitis, interstitial lung disease, kidney failure, progressive multifocal leukoencephalopathy, and birth defects.  The patient understands that monitoring is required including a baseline creatinine and regular CBC testing. In addition, patient must alert us immediately if symptoms of infection or other concerning signs are noted.
High Dose Vitamin A Pregnancy And Lactation Text: High dose vitamin A therapy is contraindicated during pregnancy and breast feeding.
Itraconazole Pregnancy And Lactation Text: This medication is Pregnancy Category C and it isn't know if it is safe during pregnancy. It is also excreted in breast milk.
Sski Pregnancy And Lactation Text: This medication is Pregnancy Category D and isn't considered safe during pregnancy. It is excreted in breast milk.
Tazorac Pregnancy And Lactation Text: This medication is not safe during pregnancy. It is unknown if this medication is excreted in breast milk.
Siliq Counseling:  I discussed with the patient the risks of Siliq including but not limited to new or worsening depression, suicidal thoughts and behavior, immunosuppression, malignancy, posterior leukoencephalopathy syndrome, and serious infections.  The patient understands that monitoring is required including a PPD at baseline and must alert us or the primary physician if symptoms of infection or other concerning signs are noted. There is also a special program designed to monitor depression which is required with Siliq.
High Dose Vitamin A Counseling: Side effects reviewed, pt to contact office should one occur.
Metronidazole Pregnancy And Lactation Text: This medication is Pregnancy Category B and considered safe during pregnancy.  It is also excreted in breast milk.
Arava Counseling:  Patient counseled regarding adverse effects of Arava including but not limited to nausea, vomiting, abnormalities in liver function tests. Patients may develop mouth sores, rash, diarrhea, and abnormalities in blood counts. The patient understands that monitoring is required including LFTs and blood counts.  There is a rare possibility of scarring of the liver and lung problems that can occur when taking methotrexate. Persistent nausea, loss of appetite, pale stools, dark urine, cough, and shortness of breath should be reported immediately. Patient advised to discontinue Arava treatment and consult with a physician prior to attempting conception. The patient will have to undergo a treatment to eliminate Arava from the body prior to conception.
Itraconazole Counseling:  I discussed with the patient the risks of itraconazole including but not limited to liver damage, nausea/vomiting, neuropathy, and severe allergy.  The patient understands that this medication is best absorbed when taken with acidic beverages such as non-diet cola or ginger ale.  The patient understands that monitoring is required including baseline LFTs and repeat LFTs at intervals.  The patient understands that they are to contact us or the primary physician if concerning signs are noted.
SSKI Counseling:  I discussed with the patient the risks of SSKI including but not limited to thyroid abnormalities, metallic taste, GI upset, fever, headache, acne, arthralgias, paraesthesias, lymphadenopathy, easy bleeding, arrhythmias, and allergic reaction.
Hydroquinone Counseling:  Patient advised that medication may result in skin irritation, lightening (hypopigmentation), dryness, and burning.  In the event of skin irritation, the patient was advised to reduce the amount of the drug applied or use it less frequently.  Rarely, spots that are treated with hydroquinone can become darker (pseudoochronosis).  Should this occur, patient instructed to stop medication and call the office. The patient verbalized understanding of the proper use and possible adverse effects of hydroquinone.  All of the patient's questions and concerns were addressed.
Libtayo Counseling- I discussed with the patient the risks of Libtayo including but not limited to nausea, vomiting, diarrhea, and bone or muscle pain.  The patient verbalized understanding of the proper use and possible adverse effects of Libtayo.  All of the patient's questions and concerns were addressed.
Rituxan Pregnancy And Lactation Text: This medication is Pregnancy Category C and it isn't know if it is safe during pregnancy. It is unknown if this medication is excreted in breast milk but similar antibodies are known to be excreted.
Tazorac Counseling:  Patient advised that medication is irritating and drying.  Patient may need to apply sparingly and wash off after an hour before eventually leaving it on overnight.  The patient verbalized understanding of the proper use and possible adverse effects of tazorac.  All of the patient's questions and concerns were addressed.
Minocycline Counseling: Patient advised regarding possible photosensitivity and discoloration of the teeth, skin, lips, tongue and gums.  Patient instructed to avoid sunlight, if possible.  When exposed to sunlight, patients should wear protective clothing, sunglasses, and sunscreen.  The patient was instructed to call the office immediately if the following severe adverse effects occur:  hearing changes, easy bruising/bleeding, severe headache, or vision changes.  The patient verbalized understanding of the proper use and possible adverse effects of minocycline.  All of the patient's questions and concerns were addressed.
Isotretinoin Pregnancy And Lactation Text: This medication is Pregnancy Category X and is considered extremely dangerous during pregnancy. It is unknown if it is excreted in breast milk.
Azathioprine Counseling:  I discussed with the patient the risks of azathioprine including but not limited to myelosuppression, immunosuppression, hepatotoxicity, lymphoma, and infections.  The patient understands that monitoring is required including baseline LFTs, Creatinine, possible TPMP genotyping and weekly CBCs for the first month and then every 2 weeks thereafter.  The patient verbalized understanding of the proper use and possible adverse effects of azathioprine.  All of the patient's questions and concerns were addressed.
Spironolactone Pregnancy And Lactation Text: This medication can cause feminization of the male fetus and should be avoided during pregnancy. The active metabolite is also found in breast milk.
Hydroxychloroquine Pregnancy And Lactation Text: This medication has been shown to cause fetal harm but it isn't assigned a Pregnancy Risk Category. There are small amounts excreted in breast milk.
Griseofulvin Pregnancy And Lactation Text: This medication is Pregnancy Category X and is known to cause serious birth defects. It is unknown if this medication is excreted in breast milk but breast feeding should be avoided.
Rituxan Counseling:  I discussed with the patient the risks of Rituxan infusions. Side effects can include infusion reactions, severe drug rashes including mucocutaneous reactions, reactivation of latent hepatitis and other infections and rarely progressive multifocal leukoencephalopathy.  All of the patient's questions and concerns were addressed.
Ivermectin Pregnancy And Lactation Text: This medication is Pregnancy Category C and it isn't known if it is safe during pregnancy. It is also excreted in breast milk.
Opioid Pregnancy And Lactation Text: These medications can lead to premature delivery and should be avoided during pregnancy. These medications are also present in breast milk in small amounts.
Isotretinoin Counseling: Patient should get monthly blood tests, not donate blood, not drive at night if vision affected, not share medication, and not undergo elective surgery for 6 months after tx completed. Side effects reviewed, pt to contact office should one occur.
Eucrisa Counseling: Patient may experience a mild burning sensation during topical application. Eucrisa is not approved in children less than 2 years of age.
Spironolactone Counseling: Patient advised regarding risks of diarrhea, abdominal pain, hyperkalemia, birth defects (for female patients), liver toxicity and renal toxicity. The patient may need blood work to monitor liver and kidney function and potassium levels while on therapy. The patient verbalized understanding of the proper use and possible adverse effects of spironolactone.  All of the patient's questions and concerns were addressed.
Hydroxychloroquine Counseling:  I discussed with the patient that a baseline ophthalmologic exam is needed at the start of therapy and every year thereafter while on therapy. A CBC may also be warranted for monitoring.  The side effects of this medication were discussed with the patient, including but not limited to agranulocytosis, aplastic anemia, seizures, rashes, retinopathy, and liver toxicity. Patient instructed to call the office should any adverse effect occur.  The patient verbalized understanding of the proper use and possible adverse effects of Plaquenil.  All the patient's questions and concerns were addressed.
Griseofulvin Counseling:  I discussed with the patient the risks of griseofulvin including but not limited to photosensitivity, cytopenia, liver damage, nausea/vomiting and severe allergy.  The patient understands that this medication is best absorbed when taken with a fatty meal (e.g., ice cream or french fries).
Ivermectin Counseling:  Patient instructed to take medication on an empty stomach with a full glass of water.  Patient informed of potential adverse effects including but not limited to nausea, diarrhea, dizziness, itching, and swelling of the extremities or lymph nodes.  The patient verbalized understanding of the proper use and possible adverse effects of ivermectin.  All of the patient's questions and concerns were addressed.
Topical Retinoid counseling:  Patient advised to apply a pea-sized amount only at bedtime and wait 30 minutes after washing their face before applying.  If too drying, patient may add a non-comedogenic moisturizer. The patient verbalized understanding of the proper use and possible adverse effects of retinoids.  All of the patient's questions and concerns were addressed.
Quinolones Counseling:  I discussed with the patient the risks of fluoroquinolones including but not limited to GI upset, allergic reaction, drug rash, diarrhea, dizziness, photosensitivity, yeast infections, liver function test abnormalities, tendonitis/tendon rupture.
Opioid Counseling: I discussed with the patient the potential side effects of opioids including but not limited to addiction, altered mental status, and depression. I stressed avoiding alcohol, benzodiazepines, muscle relaxants and sleep aids unless specifically okayed by a physician. The patient verbalized understanding of the proper use and possible adverse effects of opioids. All of the patient's questions and concerns were addressed. They were instructed to flush the remaining pills down the toilet if they did not need them for pain.
Xolair Pregnancy And Lactation Text: This medication is Pregnancy Category B and is considered safe during pregnancy. This medication is excreted in breast milk.
Bexarotene Pregnancy And Lactation Text: This medication is Pregnancy Category X and should not be given to women who are pregnant or may become pregnant. This medication should not be used if you are breast feeding.
Glycopyrrolate Pregnancy And Lactation Text: This medication is Pregnancy Category B and is considered safe during pregnancy. It is unknown if it is excreted breast milk.
Solaraze Pregnancy And Lactation Text: This medication is Pregnancy Category B and is considered safe. There is some data to suggest avoiding during the third trimester. It is unknown if this medication is excreted in breast milk.
Birth Control Pills Pregnancy And Lactation Text: This medication should be avoided if pregnant and for the first 30 days post-partum.
Xolair Counseling:  Patient informed of potential adverse effects including but not limited to fever, muscle aches, rash and allergic reactions.  The patient verbalized understanding of the proper use and possible adverse effects of Xolair.  All of the patient's questions and concerns were addressed.
Elidel Counseling: Patient may experience a mild burning sensation during topical application. Elidel is not approved in children less than 2 years of age. There have been case reports of hematologic and skin malignancies in patients using topical calcineurin inhibitors although causality is questionable.
Infliximab Counseling:  I discussed with the patient the risks of infliximab including but not limited to myelosuppression, immunosuppression, autoimmune hepatitis, demyelinating diseases, lymphoma, and serious infections.  The patient understands that monitoring is required including a PPD at baseline and must alert us or the primary physician if symptoms of infection or other concerning signs are noted.
Bexarotene Counseling:  I discussed with the patient the risks of bexarotene including but not limited to hair loss, dry lips/skin/eyes, liver abnormalities, hyperlipidemia, pancreatitis, depression/suicidal ideation, photosensitivity, drug rash/allergic reactions, hypothyroidism, anemia, leukopenia, infection, cataracts, and teratogenicity.  Patient understands that they will need regular blood tests to check lipid profile, liver function tests, white blood cell count, thyroid function tests and pregnancy test if applicable.
Glycopyrrolate Counseling:  I discussed with the patient the risks of glycopyrrolate including but not limited to skin rash, drowsiness, dry mouth, difficulty urinating, and blurred vision.
Fluconazole Counseling:  Patient counseled regarding adverse effects of fluconazole including but not limited to headache, diarrhea, nausea, upset stomach, liver function test abnormalities, taste disturbance, and stomach pain.  There is a rare possibility of liver failure that can occur when taking fluconazole.  The patient understands that monitoring of LFTs and kidney function test may be required, especially at baseline. The patient verbalized understanding of the proper use and possible adverse effects of fluconazole.  All of the patient's questions and concerns were addressed.
Solaraze Counseling:  I discussed with the patient the risks of Solaraze including but not limited to erythema, scaling, itching, weeping, crusting, and pain.
Birth Control Pills Counseling: Birth Control Pill Counseling: I discussed with the patient the potential side effects of OCPs including but not limited to increased risk of stroke, heart attack, thrombophlebitis, deep venous thrombosis, hepatic adenomas, breast changes, GI upset, headaches, and depression.  The patient verbalized understanding of the proper use and possible adverse effects of OCPs. All of the patient's questions and concerns were addressed.
Albendazole Counseling:  I discussed with the patient the risks of albendazole including but not limited to cytopenia, kidney damage, nausea/vomiting and severe allergy.  The patient understands that this medication is being used in an off-label manner.
Xelalexyz Pregnancy And Lactation Text: This medication is Pregnancy Category D and is not considered safe during pregnancy.  The risk during breast feeding is also uncertain.
Azithromycin Counseling:  I discussed with the patient the risks of azithromycin including but not limited to GI upset, allergic reaction, drug rash, diarrhea, and yeast infections.
Rifampin Counseling: I discussed with the patient the risks of rifampin including but not limited to liver damage, kidney damage, red-orange body fluids, nausea/vomiting and severe allergy.
Drysol Pregnancy And Lactation Text: This medication is considered safe during pregnancy and breast feeding.
Acitretin Pregnancy And Lactation Text: This medication is Pregnancy Category X and should not be given to women who are pregnant or may become pregnant in the future. This medication is excreted in breast milk.
Propranolol Pregnancy And Lactation Text: This medication is Pregnancy Category C and it isn't known if it is safe during pregnancy. It is excreted in breast milk.
Ilumya Counseling: I discussed with the patient the risks of tildrakizumab including but not limited to immunosuppression, malignancy, posterior leukoencephalopathy syndrome, and serious infections.  The patient understands that monitoring is required including a PPD at baseline and must alert us or the primary physician if symptoms of infection or other concerning signs are noted.

## 2021-12-10 NOTE — PROCEDURE: PRESCRIPTION MEDICATION MANAGEMENT
Detail Level: Zone
Continue Regimen: -Ketoconazole cream q am prn flare. (He has already)\\n-Triamcinolone cream q hs prn flare
Render In Strict Bullet Format?: No

## 2022-03-22 ENCOUNTER — APPOINTMENT (RX ONLY)
Dept: URBAN - NONMETROPOLITAN AREA CLINIC 13 | Facility: CLINIC | Age: 82
Setting detail: DERMATOLOGY
End: 2022-03-22

## 2022-03-22 DIAGNOSIS — L30.4 ERYTHEMA INTERTRIGO: ICD-10-CM

## 2022-03-22 PROCEDURE — ? COUNSELING

## 2022-03-22 PROCEDURE — ? ADDITIONAL NOTES

## 2022-03-22 PROCEDURE — ? PRESCRIPTION

## 2022-03-22 PROCEDURE — ? KOH PREP

## 2022-03-22 PROCEDURE — 99213 OFFICE O/P EST LOW 20 MIN: CPT

## 2022-03-22 RX ORDER — KETOCONAZOLE 20 MG/G
AAA CREAM TOPICAL BID
Qty: 60 | Refills: 1 | Status: ERX | COMMUNITY
Start: 2022-03-22

## 2022-03-22 RX ADMIN — KETOCONAZOLE AAA: 20 CREAM TOPICAL at 00:00

## 2022-03-22 ASSESSMENT — LOCATION DETAILED DESCRIPTION DERM: LOCATION DETAILED: GENITALIA

## 2022-03-22 ASSESSMENT — LOCATION ZONE DERM: LOCATION ZONE: GENITALIA

## 2022-03-22 ASSESSMENT — LOCATION SIMPLE DESCRIPTION DERM: LOCATION SIMPLE: GENITALIA

## 2022-03-22 NOTE — PROCEDURE: ADDITIONAL NOTES
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
Detail Level: Simple
Render Risk Assessment In Note?: no

## 2023-02-02 VITALS
WEIGHT: 241 LBS | HEART RATE: 88 BPM | DIASTOLIC BLOOD PRESSURE: 73 MMHG | HEIGHT: 71 IN | TEMPERATURE: 96.9 F | SYSTOLIC BLOOD PRESSURE: 123 MMHG

## 2023-02-06 ENCOUNTER — OFFICE (OUTPATIENT)
Dept: URBAN - METROPOLITAN AREA CLINIC 99 | Facility: CLINIC | Age: 83
End: 2023-02-06

## 2023-02-06 DIAGNOSIS — R10.13 EPIGASTRIC PAIN: ICD-10-CM

## 2023-02-06 DIAGNOSIS — K30 DYSPEPSIA: ICD-10-CM

## 2023-02-06 DIAGNOSIS — C61 CARCINOMA OF PROSTATE: ICD-10-CM

## 2023-02-06 PROCEDURE — 46221 LIGATION OF HEMORRHOID(S): CPT | Performed by: INTERNAL MEDICINE

## 2023-02-06 PROCEDURE — 99214 OFFICE O/P EST MOD 30 MIN: CPT | Mod: 25 | Performed by: INTERNAL MEDICINE

## 2023-02-06 NOTE — SERVICEHPINOTES
12/3/20: I had the pleasure of performing a telemedicine visit with your patient today accompanied by his wife. The telemedicine format was utilized secondary to the coronavirus pandemic and subsequent restrictions. The did not have video capability. I am meeting them for the first time as he was previously followed in our practice by Dr. Waters with his last telemedicine visit on June 8, 2020.The patient reports a more than 20 year history of intermittent epigastric discomfort. He describes this as a "pressure" which is more likely to occur in a postprandial fashion. He has been using an over-the-counter "Digestzen" which provides him with improvement recommended by his daughter. It is more likely to occur after coffee, creamers and sauces. He states he was placed on a medication such as Prilosec in the remote past which he took for 10 years without problems. His physician then recommend that he discontinue the medication over concerns with long-term usage. His wife states he had a prior history of ulcer disease. The patient was taking low-dose aspirin on a daily basis but discontinue the aspirin four months ago. He is not currently taking nonsteroidal anti-inflammatory medications. There is no anorexia, weight change, nausea, vomiting, regurgitation, heartburn, dysphagia, jaundice or fevers. There is no prior history of gallbladder disease. He is moving his bowels on a regular basis without diarrhea, constipation, rectal bleeding, melena or mucus.The patient underwent an upper endoscopy with Dr. Patricio on February 4, 2014 showing no evidence of esophagitis, Quintanilla's epithelium, ulcers or Helicobacter pylori. A colonoscopy on April 28, 2014 to the terminal ileum showed some mild to moderate diverticulosis no other abnormalities.Abdominal ultrasound on October 5, 2017 showed fatty infiltration of liver but no evidence of cholelithiasis.Laboratory studies from October 3, 2017 included a normal lipase, liver function tests.CT Urogram on March 21, 2017 showed renal cysts and diverticulosis but no underlying hepatic, biliary or pancreatic abnormalities.4/7/21: I had the pleasure of performing a telemedicine visit with your patient today accompanied by his wife. The telemedicine format was utilized secondary to the coronavirus pandemic and subsequent restrictions. They did not have video capability for today's visit. His last telemedicine visit was conducted on December 3, 2020.Abdominal ultrasound from December 14, 2020 showed no evidence of gallstones, biliary ductal dilatation or focal hepatic abnormalities. Bilateral renal cysts were noted. The patient has a long-standing history of renal cysts.At the time of his last visit I recommended he begin using pantoprazole 40 mg daily. He did not comply with this recommendation.An upper endoscopy was performed by Dr. Campos on March 23, 2021. This examination showed no evidence of esophagitis or Quintanilla's epithelium. There was some patchy erythema in the body and antrum of stomach with biopsies negative for Helicobacter pylori. There was some peptic duodenitis.Following the procedure he began using pantoprazole 40 mg each morning 30 to 60 minutes before breakfast. After several days there is no clinical improvement but over the last five days his previous complaints of epigastric pressure pains have resolved. There is no history of anorexia or weight change. According to the patient he is 5'11" and weighs 242 pounds which correlates to a BMI of 33.7. He is not experiencing nausea, vomiting, heartburn, regurgitation, dysphagia, jaundice or fevers. He is moving his bowels on a regular basis without constipation, diarrhea, rectal bleeding, melena or mucus. 
br
ady GOZNALEZ   returns today for follow-up from last visit on   4/7/2021  . Symptoms that he relates to both internal and external hemorrhoids. Denies any constipation or diarrhea. He is typically on the toilet for 3 to 7 minutes at a time. He does have a sensation of incomplete evacuation. He is using Gummies and improves to ensure his bowel regularity with benefit but does have significant leaking or soiling. This typically occurs with his first bowel movement and then several hours later where he feels some moisture or wetness. He has also been recently diagnosed with prostate cancer but is currently not taking any therapy for this. It is being monitored. He is not on any prescription medications and only uses baby aspirin several times a week. In terms of his reflux and dyspepsia, he is clinically asymptomatic for the most part. He denies any blood or mucus in his stools. He does notice increasing frequency of stools. His last colonoscopy was in 2014.

## 2023-03-14 ENCOUNTER — OFFICE (OUTPATIENT)
Dept: URBAN - METROPOLITAN AREA CLINIC 99 | Facility: CLINIC | Age: 83
End: 2023-03-14

## 2023-03-14 VITALS
DIASTOLIC BLOOD PRESSURE: 76 MMHG | WEIGHT: 243 LBS | HEIGHT: 71 IN | HEART RATE: 84 BPM | SYSTOLIC BLOOD PRESSURE: 151 MMHG | TEMPERATURE: 97.1 F

## 2023-03-14 DIAGNOSIS — K30 DYSPEPSIA: ICD-10-CM

## 2023-03-14 DIAGNOSIS — R10.13 EPIGASTRIC PAIN: ICD-10-CM

## 2023-03-14 DIAGNOSIS — C61 CARCINOMA OF PROSTATE: ICD-10-CM

## 2023-03-14 PROCEDURE — 46221 LIGATION OF HEMORRHOID(S): CPT | Performed by: INTERNAL MEDICINE

## 2023-03-14 PROCEDURE — 99213 OFFICE O/P EST LOW 20 MIN: CPT | Mod: 25 | Performed by: INTERNAL MEDICINE

## 2023-03-14 RX ORDER — PANTOPRAZOLE 20 MG/1
TABLET, DELAYED RELEASE ORAL
Qty: 30 | Status: COMPLETED
Start: 2020-12-03 | End: 2023-08-02

## 2023-03-14 NOTE — SERVICEHPINOTES
12/3/20: I had the pleasure of performing a telemedicine visit with your patient today accompanied by his wife. The telemedicine format was utilized secondary to the coronavirus pandemic and subsequent restrictions. The did not have video capability. I am meeting them for the first time as he was previously followed in our practice by Dr. Waters with his last telemedicine visit on June 8, 2020.The patient reports a more than 20 year history of intermittent epigastric discomfort. He describes this as a "pressure" which is more likely to occur in a postprandial fashion. He has been using an over-the-counter "Digestzen" which provides him with improvement recommended by his daughter. It is more likely to occur after coffee, creamers and sauces. He states he was placed on a medication such as Prilosec in the remote past which he took for 10 years without problems. His physician then recommend that he discontinue the medication over concerns with long-term usage. His wife states he had a prior history of ulcer disease. The patient was taking low-dose aspirin on a daily basis but discontinue the aspirin four months ago. He is not currently taking nonsteroidal anti-inflammatory medications. There is no anorexia, weight change, nausea, vomiting, regurgitation, heartburn, dysphagia, jaundice or fevers. There is no prior history of gallbladder disease. He is moving his bowels on a regular basis without diarrhea, constipation, rectal bleeding, melena or mucus.The patient underwent an upper endoscopy with Dr. Patricio on February 4, 2014 showing no evidence of esophagitis, Quintanilla's epithelium, ulcers or Helicobacter pylori. A colonoscopy on April 28, 2014 to the terminal ileum showed some mild to moderate diverticulosis no other abnormalities.Abdominal ultrasound on October 5, 2017 showed fatty infiltration of liver but no evidence of cholelithiasis.Laboratory studies from October 3, 2017 included a normal lipase, liver function tests.CT Urogram on March 21, 2017 showed renal cysts and diverticulosis but no underlying hepatic, biliary or pancreatic abnormalities.4/7/21: I had the pleasure of performing a telemedicine visit with your patient today accompanied by his wife. The telemedicine format was utilized secondary to the coronavirus pandemic and subsequent restrictions. They did not have video capability for today's visit. His last telemedicine visit was conducted on December 3, 2020.Abdominal ultrasound from December 14, 2020 showed no evidence of gallstones, biliary ductal dilatation or focal hepatic abnormalities. Bilateral renal cysts were noted. The patient has a long-standing history of renal cysts.At the time of his last visit I recommended he begin using pantoprazole 40 mg daily. He did not comply with this recommendation.An upper endoscopy was performed by Dr. Campos on March 23, 2021. This examination showed no evidence of esophagitis or Quintanilla's epithelium. There was some patchy erythema in the body and antrum of stomach with biopsies negative for Helicobacter pylori. There was some peptic duodenitis.Following the procedure he began using pantoprazole 40 mg each morning 30 to 60 minutes before breakfast. After several days there is no clinical improvement but over the last five days his previous complaints of epigastric pressure pains have resolved. There is no history of anorexia or weight change. According to the patient he is 5'11" and weighs 242 pounds which correlates to a BMI of 33.7. He is not experiencing nausea, vomiting, heartburn, regurgitation, dysphagia, jaundice or fevers. He is moving his bowels on a regular basis without constipation, diarrhea, rectal bleeding, melena or mucus.RIP GONZALEZ returns today for follow-up from last visit on 4/7/2021. Symptoms that he relates to both internal and external hemorrhoids. Denies any constipation or diarrhea. He is typically on the toilet for 3 to 7 minutes at a time. He does have a sensation of incomplete evacuation. He is using Gummies and improves to ensure his bowel regularity with benefit but does have significant leaking or soiling. This typically occurs with his first bowel movement and then several hours later where he feels some moisture or wetness. He has also been recently diagnosed with prostate cancer but is currently not taking any therapy for this. It is being monitored. He is not on any prescription medications and only uses baby aspirin several times a week. In terms of his reflux and dyspepsia, he is clinically asymptomatic for the most part. He denies any blood or mucus in his stools. He does notice increasing frequency of stools. His last colonoscopy was in 2014.
br
ady GONZALEZ   returns today for follow-up from last visit on   2/6/2023  . The patient presents after his last banding and states that his leakage and itching is somewhat better. He is currently taking 2 fiber Gummies daily and MiraLAX only as needed if he does not have a bowel movement. For the most part he continues to have mushy stools. He is currently not on any reflux-like medications but has not started on a prescription anti-inflammatory medication, by his orthopedic surgeon due to recurrent low back pain. I have recommended for him to start on a proton pump inhibitor due to a concern that he is at risk for developing ulcerative esophagitis. I have mentioned, that I would send the prescription for him. He is otherwise feeling well clinically asymptomatic.   span id="{[id number]}" class="narrative freetextSelected" type="freetext" canedit="true" suppressed="false" nid="[id number]" gid="{[id number]}" bound="false" visited="true" /spanspan id="{[id number]}" class="narrative placeholderNormal" type="placeholder" canedit="true" suppressed="true" text="[ROS Wording]" nid="[id number]" gid="{[id number]}" propertyname="rosWording" displayname="ROS Wording" tooltip="" handler="" handlerdata="" datatype="text" mandatory="false" requires="" allowmultipleentries="" describes="" tagname="" prototype="" dontshowempty="false" empty="true" onmouseover="__NarrativeOnMouseOver('{[id number]}')" onmouseout="__NarrativeOnMouseOut('{[id number]}')" onmousedown="__NarrativePlaceholderClicked('{[id number]}')"/spanspan id="{[id number]}" class="narrative freetextNormal" type="freetext" canedit="true" suppressed="false" nid="[id number]" gid="{[id number]}" bound="false" /span

## 2023-03-31 ENCOUNTER — OFFICE (OUTPATIENT)
Dept: URBAN - METROPOLITAN AREA CLINIC 99 | Facility: CLINIC | Age: 83
End: 2023-03-31

## 2023-03-31 VITALS
HEART RATE: 91 BPM | DIASTOLIC BLOOD PRESSURE: 91 MMHG | SYSTOLIC BLOOD PRESSURE: 148 MMHG | WEIGHT: 240 LBS | HEIGHT: 71 IN | TEMPERATURE: 97.1 F

## 2023-03-31 DIAGNOSIS — K30 DYSPEPSIA: ICD-10-CM

## 2023-03-31 DIAGNOSIS — C61 CARCINOMA OF PROSTATE: ICD-10-CM

## 2023-03-31 DIAGNOSIS — R10.13 EPIGASTRIC PAIN: ICD-10-CM

## 2023-03-31 PROCEDURE — 46221 LIGATION OF HEMORRHOID(S): CPT | Performed by: INTERNAL MEDICINE

## 2023-03-31 PROCEDURE — 99213 OFFICE O/P EST LOW 20 MIN: CPT | Mod: 25 | Performed by: INTERNAL MEDICINE

## 2023-03-31 NOTE — SERVICEHPINOTES
12/3/20: I had the pleasure of performing a telemedicine visit with your patient today accompanied by his wife. The telemedicine format was utilized secondary to the coronavirus pandemic and subsequent restrictions. The did not have video capability. I am meeting them for the first time as he was previously followed in our practice by Dr. Waters with his last telemedicine visit on June 8, 2020.The patient reports a more than 20 year history of intermittent epigastric discomfort. He describes this as a "pressure" which is more likely to occur in a postprandial fashion. He has been using an over-the-counter "Digestzen" which provides him with improvement recommended by his daughter. It is more likely to occur after coffee, creamers and sauces. He states he was placed on a medication such as Prilosec in the remote past which he took for 10 years without problems. His physician then recommend that he discontinue the medication over concerns with long-term usage. His wife states he had a prior history of ulcer disease. The patient was taking low-dose aspirin on a daily basis but discontinue the aspirin four months ago. He is not currently taking nonsteroidal anti-inflammatory medications. There is no anorexia, weight change, nausea, vomiting, regurgitation, heartburn, dysphagia, jaundice or fevers. There is no prior history of gallbladder disease. He is moving his bowels on a regular basis without diarrhea, constipation, rectal bleeding, melena or mucus.The patient underwent an upper endoscopy with Dr. Patricio on February 4, 2014 showing no evidence of esophagitis, Quintanilla's epithelium, ulcers or Helicobacter pylori. A colonoscopy on April 28, 2014 to the terminal ileum showed some mild to moderate diverticulosis no other abnormalities.Abdominal ultrasound on October 5, 2017 showed fatty infiltration of liver but no evidence of cholelithiasis.Laboratory studies from October 3, 2017 included a normal lipase, liver function tests.CT Urogram on March 21, 2017 showed renal cysts and diverticulosis but no underlying hepatic, biliary or pancreatic abnormalities.4/7/21: I had the pleasure of performing a telemedicine visit with your patient today accompanied by his wife. The telemedicine format was utilized secondary to the coronavirus pandemic and subsequent restrictions. They did not have video capability for today's visit. His last telemedicine visit was conducted on December 3, 2020.Abdominal ultrasound from December 14, 2020 showed no evidence of gallstones, biliary ductal dilatation or focal hepatic abnormalities. Bilateral renal cysts were noted. The patient has a long-standing history of renal cysts.At the time of his last visit I recommended he begin using pantoprazole 40 mg daily. He did not comply with this recommendation.An upper endoscopy was performed by Dr. Campos on March 23, 2021. This examination showed no evidence of esophagitis or Quintanilla's epithelium. There was some patchy erythema in the body and antrum of stomach with biopsies negative for Helicobacter pylori. There was some peptic duodenitis.Following the procedure he began using pantoprazole 40 mg each morning 30 to 60 minutes before breakfast. After several days there is no clinical improvement but over the last five days his previous complaints of epigastric pressure pains have resolved. There is no history of anorexia or weight change. According to the patient he is 5'11" and weighs 242 pounds which correlates to a BMI of 33.7. He is not experiencing nausea, vomiting, heartburn, regurgitation, dysphagia, jaundice or fevers. He is moving his bowels on a regular basis without constipation, diarrhea, rectal bleeding, melena or mucus.RIP GONZALEZ returns today for follow-up from last visit on 4/7/2021. Symptoms that he relates to both internal and external hemorrhoids. Denies any constipation or diarrhea. He is typically on the toilet for 3 to 7 minutes at a time. He does have a sensation of incomplete evacuation. He is using Gummies and improves to ensure his bowel regularity with benefit but does have significant leaking or soiling. This typically occurs with his first bowel movement and then several hours later where he feels some moisture or wetness. He has also been recently diagnosed with prostate cancer but is currently not taking any therapy for this. It is being monitored. He is not on any prescription medications and only uses baby aspirin several times a week. In terms of his reflux and dyspepsia, he is clinically asymptomatic for the most part. He denies any blood or mucus in his stools. He does notice increasing frequency of stools. His last colonoscopy was in 2014.RIP GONZALEZ returns today for follow-up from last visit on 2/6/2023. The patient presents after his last banding and states that his leakage and itching is somewhat better. He is currently taking 2 fiber Gummies daily and MiraLAX only as needed if he does not have a bowel movement. For the most part he continues to have mushy stools. He is currently not on any reflux-like medications but has not started on a prescription anti-inflammatory medication, by his orthopedic surgeon due to recurrent low back pain. I have recommended for him to start on a proton pump inhibitor due to a concern that he is at risk for developing ulcerative esophagitis. I have mentioned, that I would send the prescription for him. He is otherwise feeling well clinically asymptomatic.
br
ady  RIP GONZALEZ   returns today for follow-up from last visit on   3/14/2023  .   
brThe patient states that he has undergone a recent epidural. He had interrupted his fiber gummies for several days at that time the leakage persisted but has improved. He continues on prunes and fiber gummies. He denies any diarrhea but does still continue to complain of constipation and straining. He has no nausea or vomiting. He has no prolapse, which was an issue in the past. He has no prolapse at this time, which is a significant improvement.

## 2023-03-31 NOTE — SERVICENOTES
The patient and family/friends if present were apprised of the use of a scribe through remote viewing and all parties consented to conducting the visit in this manner. Documentation assistance provided by Dr. Campos's nurse, JERRICA Melton, RN.

## 2023-04-04 NOTE — PROCEDURE: RECOMMENDATIONS
Recommendations (Free Text): Dove bar soap in armpits and groin only. Triamcinolone 0.1% bid up to 2 weeks prn flare with moisturizing CREAM over the top.
Recommendation Preamble: The following recommendations were made during the visit:
Detail Level: Zone
Recommendations (Free Text): In am apply Ketoconazole cream x 1 week and Triamcinolone in pm. When clear add Zeazorb powder daily to keep area dry.
[Negative] : Heme/Lymph

## 2023-08-01 VITALS
SYSTOLIC BLOOD PRESSURE: 118 MMHG | HEIGHT: 71 IN | HEART RATE: 71 BPM | WEIGHT: 240 LBS | DIASTOLIC BLOOD PRESSURE: 68 MMHG

## 2023-08-02 ENCOUNTER — OFFICE (OUTPATIENT)
Dept: URBAN - METROPOLITAN AREA CLINIC 95 | Facility: CLINIC | Age: 83
End: 2023-08-02

## 2023-08-02 DIAGNOSIS — C61 CARCINOMA OF PROSTATE: ICD-10-CM

## 2023-08-02 DIAGNOSIS — K30 DYSPEPSIA: ICD-10-CM

## 2023-08-02 DIAGNOSIS — R10.13 EPIGASTRIC PAIN: ICD-10-CM

## 2023-08-02 PROCEDURE — 99214 OFFICE O/P EST MOD 30 MIN: CPT | Performed by: INTERNAL MEDICINE

## 2023-10-31 ENCOUNTER — AMBULATORY SURGICAL CENTER (OUTPATIENT)
Dept: URBAN - METROPOLITAN AREA SURGERY 62 | Facility: SURGERY | Age: 83
End: 2023-10-31

## 2023-10-31 VITALS
OXYGEN SATURATION: 97 % | TEMPERATURE: 97.8 F | WEIGHT: 234 LBS | HEIGHT: 71 IN | RESPIRATION RATE: 16 BRPM | HEART RATE: 73 BPM | SYSTOLIC BLOOD PRESSURE: 119 MMHG | DIASTOLIC BLOOD PRESSURE: 66 MMHG

## 2023-10-31 DIAGNOSIS — K63.5 POLYP OF COLON: ICD-10-CM

## 2023-10-31 DIAGNOSIS — R19.4 CHANGE IN BOWEL HABIT: ICD-10-CM

## 2023-10-31 DIAGNOSIS — K57.30 DIVERTICULOSIS OF LARGE INTESTINE WITHOUT PERFORATION OR ABS: ICD-10-CM

## 2023-10-31 PROCEDURE — 45380 COLONOSCOPY AND BIOPSY: CPT | Mod: 59 | Performed by: INTERNAL MEDICINE

## 2023-10-31 PROCEDURE — 45385 COLONOSCOPY W/LESION REMOVAL: CPT | Performed by: INTERNAL MEDICINE

## 2023-11-16 PROBLEM — R19.7 CLINICALLY SIGNIFICANT DIARRHEA OF UNEXPLAINED ORIGIN: Status: ACTIVE | Noted: 2023-10-31

## 2024-02-26 VITALS — HEIGHT: 71 IN | WEIGHT: 240 LBS

## 2024-02-27 ENCOUNTER — OFFICE (OUTPATIENT)
Dept: URBAN - METROPOLITAN AREA CLINIC 95 | Facility: CLINIC | Age: 84
End: 2024-02-27

## 2024-02-27 DIAGNOSIS — K30 DYSPEPSIA: ICD-10-CM

## 2024-02-27 DIAGNOSIS — K61.0 PERIANAL ABSCESS: ICD-10-CM

## 2024-02-27 DIAGNOSIS — C61 CARCINOMA OF PROSTATE: ICD-10-CM

## 2024-02-27 DIAGNOSIS — R10.13 EPIGASTRIC PAIN: ICD-10-CM

## 2024-02-27 DIAGNOSIS — K60.3 FISTULA, ANAL: ICD-10-CM

## 2024-02-27 PROCEDURE — G2211 COMPLEX E/M VISIT ADD ON: HCPCS | Performed by: INTERNAL MEDICINE

## 2024-02-27 PROCEDURE — 99214 OFFICE O/P EST MOD 30 MIN: CPT | Performed by: INTERNAL MEDICINE

## 2024-04-04 ENCOUNTER — APPOINTMENT (RX ONLY)
Dept: URBAN - NONMETROPOLITAN AREA CLINIC 13 | Facility: CLINIC | Age: 84
Setting detail: DERMATOLOGY
End: 2024-04-04

## 2024-04-04 DIAGNOSIS — L30.4 ERYTHEMA INTERTRIGO: ICD-10-CM | Status: INADEQUATELY CONTROLLED

## 2024-04-04 DIAGNOSIS — A60.9 ANOGENITAL HERPESVIRAL INFECTION, UNSPECIFIED: ICD-10-CM | Status: INADEQUATELY CONTROLLED

## 2024-04-04 PROCEDURE — 99214 OFFICE O/P EST MOD 30 MIN: CPT

## 2024-04-04 PROCEDURE — ? COUNSELING

## 2024-04-04 PROCEDURE — ? PRESCRIPTION

## 2024-04-04 RX ORDER — NYSTATIN 100000 [USP'U]/G
POWDER TOPICAL BID
Qty: 60 | Refills: 11 | Status: ERX | COMMUNITY
Start: 2024-04-04

## 2024-04-04 RX ORDER — VALACYCLOVIR HYDROCHLORIDE 1 G/1
TABLET, FILM COATED ORAL QD
Qty: 30 | Refills: 0 | Status: ERX

## 2024-04-04 RX ADMIN — NYSTATIN AAA: 100000 POWDER TOPICAL at 00:00

## 2024-04-04 ASSESSMENT — LOCATION SIMPLE DESCRIPTION DERM: LOCATION SIMPLE: SCROTUM

## 2024-04-04 ASSESSMENT — LOCATION ZONE DERM: LOCATION ZONE: GENITALIA

## 2024-04-04 ASSESSMENT — LOCATION DETAILED DESCRIPTION DERM: LOCATION DETAILED: LEFT SCROTUM

## 2024-04-04 NOTE — HPI: RASH
What Type Of Note Output Would You Prefer (Optional)?: Standard Output
How Severe Is Your Rash?: moderate
Is This A New Presentation, Or A Follow-Up?: Rash
Additional History: Pt has history of intertrigo and Herpes simplex in the groin area

## 2025-07-03 ENCOUNTER — APPOINTMENT (OUTPATIENT)
Dept: URBAN - NONMETROPOLITAN AREA CLINIC 13 | Facility: CLINIC | Age: 85
Setting detail: DERMATOLOGY
End: 2025-07-03

## 2025-07-03 DIAGNOSIS — L57.8 OTHER SKIN CHANGES DUE TO CHRONIC EXPOSURE TO NONIONIZING RADIATION: ICD-10-CM

## 2025-07-03 DIAGNOSIS — L57.0 ACTINIC KERATOSIS: ICD-10-CM

## 2025-07-03 DIAGNOSIS — L21.8 OTHER SEBORRHEIC DERMATITIS: ICD-10-CM

## 2025-07-03 DIAGNOSIS — D485 NEOPLASM OF UNCERTAIN BEHAVIOR OF SKIN: ICD-10-CM

## 2025-07-03 PROBLEM — D48.5 NEOPLASM OF UNCERTAIN BEHAVIOR OF SKIN: Status: ACTIVE | Noted: 2025-07-03

## 2025-07-03 PROCEDURE — ? PRESCRIPTION

## 2025-07-03 PROCEDURE — ? SUNSCREEN RECOMMENDATIONS

## 2025-07-03 PROCEDURE — ? COUNSELING

## 2025-07-03 PROCEDURE — ? MEDICATION COUNSELING

## 2025-07-03 PROCEDURE — ? BIOPSY BY SHAVE METHOD

## 2025-07-03 PROCEDURE — ? LIQUID NITROGEN

## 2025-07-03 RX ORDER — HYDROCORTISONE 25 MG/G
CREAM TOPICAL BID
Qty: 20 | Refills: 5 | Status: ERX | COMMUNITY
Start: 2025-07-03

## 2025-07-03 RX ADMIN — HYDROCORTISONE: 25 CREAM TOPICAL at 00:00

## 2025-07-03 ASSESSMENT — LOCATION ZONE DERM
LOCATION ZONE: SCALP
LOCATION ZONE: FACE
LOCATION ZONE: EYELID
LOCATION ZONE: TRUNK

## 2025-07-03 ASSESSMENT — LOCATION SIMPLE DESCRIPTION DERM
LOCATION SIMPLE: LEFT ZYGOMA
LOCATION SIMPLE: SCALP
LOCATION SIMPLE: RIGHT UPPER BACK
LOCATION SIMPLE: LEFT INFERIOR EYELID

## 2025-07-03 ASSESSMENT — LOCATION DETAILED DESCRIPTION DERM
LOCATION DETAILED: LEFT CENTRAL FRONTAL SCALP
LOCATION DETAILED: LEFT MEDIAL ZYGOMA
LOCATION DETAILED: LEFT INFERIOR EYELID
LOCATION DETAILED: RIGHT SUPERIOR LATERAL UPPER BACK

## 2025-07-03 NOTE — PROCEDURE: MEDICATION COUNSELING
On-body Injector for Neulasta Patient Instructions       Your On-Body Injection device was applied on this day:  12/13 at this time 2pm    Your dose of medication will start on this day: 12/14 at this time 5pm Skyrizi Counseling: I discussed with the patient the risks of risankizumab-rzaa including but not limited to immunosuppression, and serious infections.  The patient understands that monitoring is required including a PPD at baseline and must alert us or the primary physician if symptoms of infection or other concerning signs are noted.

## 2025-07-03 NOTE — PROCEDURE: MEDICATION COUNSELING
present Picato Counseling:  I discussed with the patient the risks of Picato including but not limited to erythema, scaling, itching, weeping, crusting, and pain.

## 2025-07-03 NOTE — PROCEDURE: MEDICATION COUNSELING
Increase patient to functional baseline. Acitretin Counseling:  I discussed with the patient the risks of acitretin including but not limited to hair loss, dry lips/skin/eyes, liver damage, hyperlipidemia, depression/suicidal ideation, photosensitivity.  Serious rare side effects can include but are not limited to pancreatitis, pseudotumor cerebri, bony changes, clot formation/stroke/heart attack.  Patient understands that alcohol is contraindicated since it can result in liver toxicity and significantly prolong the elimination of the drug by many years.

## 2025-07-03 NOTE — PROCEDURE: MEDICATION COUNSELING
The patient is a 58y Female complaining of pain, foot. Metronidazole Pregnancy And Lactation Text: This medication is Pregnancy Category B and considered safe during pregnancy.  It is also excreted in breast milk.

## 2025-07-03 NOTE — PROCEDURE: SUNSCREEN RECOMMENDATIONS
Products Recommended: - la Roche posay
Detail Level: Detailed
General Sunscreen Counseling: I recommended a broad spectrum sunscreen with a SPF of 30 or higher.  I explained that SPF 30 sunscreens block approximately 97 percent of the sun's harmful rays.  Sunscreens should be applied at least 15 minutes prior to expected sun exposure and then every 2 hours after that as long as sun exposure continues. If swimming or exercising sunscreen should be reapplied every 45 minutes to an hour after getting wet or sweating.  One ounce, or the equivalent of a shot glass full of sunscreen, is adequate to protect the skin not covered by a bathing suit. I also recommended a lip balm with a sunscreen as well. Sun protective clothing can be used in lieu of sunscreen but must be worn the entire time you are exposed to the sun's rays.

## 2025-07-03 NOTE — PROCEDURE: MEDICATION COUNSELING
Male Over the Counter Salicylic Acid Counseling: Over the counter salicylic acid preparations can be used effectively to treat warts at home. There are two types of application: liquid and plaster. Liquid preparations are applied like nail polish and the plaster applications are applied like a bandage (you may need to apply duct tape over the plaster to keep it in place). Dead and macerated skin should be removed regularly with a nail file or nail clippers for best results.

## 2025-07-03 NOTE — PROCEDURE: BIOPSY BY SHAVE METHOD
Detail Level: Detailed
Depth Of Biopsy: dermis
Was A Bandage Applied: Yes
Size Of Lesion In Cm: 0.6
X Size Of Lesion In Cm: 0
Biopsy Type: H and E
Biopsy Method: Dermablade
Anesthesia Type: 1% lidocaine with epinephrine
Anesthesia Volume In Cc: 0.5
Hemostasis: Drysol
Wound Care: Petrolatum
Dressing: bandage
Destruction After The Procedure: No
Type Of Destruction Used: Curettage
Curettage Text: The wound bed was treated with curettage after the biopsy was performed.
Cryotherapy Text: The wound bed was treated with cryotherapy after the biopsy was performed.
Electrodesiccation Text: The wound bed was treated with electrodesiccation after the biopsy was performed.
Electrodesiccation And Curettage Text: The wound bed was treated with electrodesiccation and curettage after the biopsy was performed.
Silver Nitrate Text: The wound bed was treated with silver nitrate after the biopsy was performed.
Lab: [id number]
Lab Facility: [id number]
Medical Necessity Information: It is in your best interest to select a reason for this procedure from the list below. All of these items fulfill various CMS LCD requirements except the new and changing color options.
Consent: Written consent was obtained and risks were reviewed including but not limited to scarring, infection, bleeding, scabbing, incomplete removal, nerve damage and allergy to anesthesia.
Post-Care Instructions: I reviewed with the patient in detail post-care instructions. Patient is to keep the biopsy site dry overnight, and then apply bacitracin twice daily until healed. Patient may apply hydrogen peroxide soaks to remove any crusting.
Notification Instructions: Patient will be notified of biopsy results. However, patient instructed to call the office if not contacted within 2 weeks.
Billing Type: Third-Party Bill
Information: Selecting Yes will display possible errors in your note based on the variables you have selected. This validation is only offered as a suggestion for you. PLEASE NOTE THAT THE VALIDATION TEXT WILL BE REMOVED WHEN YOU FINALIZE YOUR NOTE. IF YOU WANT TO FAX A PRELIMINARY NOTE YOU WILL NEED TO TOGGLE THIS TO 'NO' IF YOU DO NOT WANT IT IN YOUR FAXED NOTE.